# Patient Record
Sex: MALE | Race: WHITE | NOT HISPANIC OR LATINO | Employment: FULL TIME | URBAN - METROPOLITAN AREA
[De-identification: names, ages, dates, MRNs, and addresses within clinical notes are randomized per-mention and may not be internally consistent; named-entity substitution may affect disease eponyms.]

---

## 2022-12-06 ENCOUNTER — APPOINTMENT (OUTPATIENT)
Dept: GENERAL RADIOLOGY | Facility: HOSPITAL | Age: 57
End: 2022-12-06

## 2022-12-06 PROCEDURE — 71046 X-RAY EXAM CHEST 2 VIEWS: CPT

## 2022-12-06 PROCEDURE — 71046 X-RAY EXAM CHEST 2 VIEWS: CPT | Performed by: EMERGENCY MEDICINE

## 2025-01-24 ENCOUNTER — OFFICE VISIT (OUTPATIENT)
Dept: ORTHOPEDIC SURGERY | Facility: CLINIC | Age: 60
End: 2025-01-24
Payer: OTHER MISCELLANEOUS

## 2025-01-24 VITALS — TEMPERATURE: 98.3 F | BODY MASS INDEX: 31.21 KG/M2 | HEIGHT: 70 IN | WEIGHT: 218 LBS

## 2025-01-24 DIAGNOSIS — S46.009A ROTATOR CUFF INJURY, INITIAL ENCOUNTER: Primary | ICD-10-CM

## 2025-01-24 DIAGNOSIS — M25.511 RIGHT SHOULDER PAIN, UNSPECIFIED CHRONICITY: ICD-10-CM

## 2025-01-24 RX ORDER — TESTOSTERONE CYPIONATE 200 MG/ML
INJECTION, SOLUTION INTRAMUSCULAR
COMMUNITY
Start: 2025-01-16

## 2025-01-24 RX ORDER — LEVOTHYROXINE SODIUM 125 UG/1
1 TABLET ORAL DAILY
COMMUNITY
Start: 2024-10-30

## 2025-01-24 RX ORDER — FINASTERIDE 1 MG/1
1 TABLET, FILM COATED ORAL DAILY
COMMUNITY
Start: 2025-01-15

## 2025-01-24 NOTE — PROGRESS NOTES
[facility]              New Problem      Patient Name: Ortega Corral  : 1965  Primary Care Physician: Provider, No Known        Chief Complaint:  Right shoulder injury    HPI:   Ortega Corral is a 59 y.o. year old who presents today for evaluation.  History of Present Illness  The patient presents for evaluation of a shoulder injury.    He sustained the injury on 2024 after slipping on ice and landing on his arm. He experienced immediate pain, which persisted for approximately 3 hours post-incident. The pain is now primarily triggered by movement, particularly during sleep. He sought medical attention at [facility], where radiographic imaging was performed. The initial interpretation suggested no fractures.The pain is absent when the arm is stationary but is felt in the back of the joint during certain movements, such as washing his hair. He also experiences pain at the top and back of the shoulder when reaching in specific directions. Slow movements allow him to reach certain positions without pain. He reports no elbow pain. He is right-handed and works as an , a job he has not missed due to this injury as he has more of a supervisory role. He has been managing the pain with Aleve, taken before work and 2 to 3 hours before bedtime. He was prescribed muscle relaxers at [facility] but has not taken them. He has found some relief from melatonin, which he has taken for the past 2 nights to help with sleep but not pain.    SOCIAL HISTORY  He works as an .    MEDICATIONS  Aleve, melatonin    Past Medical/Surgical, Social and Family History:  I have reviewed and/or updated pertinent history as noted in the medical record including:  Past Medical History:   Diagnosis Date    Hypertension      Past Surgical History:   Procedure Laterality Date    KNEE SURGERY Right     ORIF CALCANEAL FRACTURE Left      Social History     Occupational History    Not on file    Tobacco Use    Smoking status: Former     Types: Cigarettes     Passive exposure: Never    Smokeless tobacco: Never    Tobacco comments:     Currently vapes   Vaping Use    Vaping status: Never Used   Substance and Sexual Activity    Alcohol use: Never    Drug use: Defer    Sexual activity: Yes     Partners: Female          Allergies: No Known Allergies    Medications:   Home Medications:  Current Outpatient Medications on File Prior to Visit   Medication Sig    Cholecalciferol (VITAMIN D3 PO) Take  by mouth.    finasteride (PROPECIA) 1 MG tablet Take 1 tablet by mouth Daily.    hydroCHLOROthiazide (HYDRODIURIL) 25 MG tablet TAKE 1 TABLET BY MOUTH ONCE DAILY IN THE MORNING FOR HIGH BLOOD PRESSURE/SWELLING    levothyroxine (SYNTHROID, LEVOTHROID) 125 MCG tablet Take 1 tablet by mouth Daily.    lisinopril (PRINIVIL,ZESTRIL) 20 MG tablet TAKE 1 TABLET BY MOUTH IN THE MORNING FOR HIGH BLOOD PRESSURE    Magnesium 400 MG capsule Take  by mouth.    metoprolol succinate XL (TOPROL-XL) 25 MG 24 hr tablet Take 1 tablet by mouth Daily.    Multiple Vitamins-Minerals (ZINC PO) Take 50 mg by mouth.    omeprazole (priLOSEC) 20 MG capsule Take 1 capsule by mouth Daily.    Prasterone, DHEA, (DHEA PO) Take  by mouth. DHEA 25    Testosterone Cypionate (DEPOTESTOTERONE CYPIONATE) 200 MG/ML injection INJECT 0.3ml twice WEEKLY into THE muscle    methylPREDNISolone (MEDROL) 4 MG dose pack Take as directed on package instructions.     No current facility-administered medications on file prior to visit.         ROS:  ROS negative except as listed in the HPI.    Physical Exam:   59 y.o. male  Body mass index is 31.28 kg/m²., 98.9 kg (218 lb)  Vitals:    01/24/25 1250   Temp: 98.3 °F (36.8 °C)     General: Alert, cooperative, appears well and in no observable distress. Appears stated age and BMI as listed above.  HEENT: Normocephalic, atraumatic on external visual inspection.  CV: No significant peripheral edema.  Respiratory: Normal  respiratory effort.  Skin: Warm & well perfused; appropriate skin turgor.  Psych: Appropriate mood & affect.  Neuro: Gross sensation and motor intact in affected extremity/extremities.  Vascular: Peripheral pulses palpable in affected extremity/extremities.   Physical Exam  Shoulder exam was performed.    MSK Exam:  Right Shoulder  No obvious deformities or wounds.   No redness or significant swelling.  Tenderness  lateral acromial, posterior acromial  Definitive painful arc.  + drop arm test  AROM  Flexion 180   ER 50   IR SI region     Rotator Cuff Strength:  Supraspinatus: 4+  ER: 3   IR: 4+  Isometric testing of the rotator cuff is painful.    Left Shoulder  No deformity or wounds.  No redness or swelling.  No tenderness to palpation.  Full, painless AROM.  Cuff Strength 4+ to 5/5 with isometric testing of the rotator cuff and painless.  Negative provocative testing.    Brief examination of the cervical spine did not reproduce any specific radicular pattern or symptoms.   Strength is reasonable and symmetric in the upper extremities.       Radiology:    Results  Imaging  X-ray of the shoulder shows no fracture but significant arthritis in the AC joint.    The following X-rays were ordered/reviewed today to evaluate the patient's symptoms: Shoulder: AP, Scapular Y and Axillary Lateral of right shoulder(s) show moderately advanced hypertrophic acromioclavicular joint arthritis. No obvious fractures. He has reasonable GH and subacromial space. Multiple calcified granulomas in the visualized portion of the lung fields. There are no prior films available for direct comparison.    Procedure:   N/A      Misc. Data/Labs: N/A    Assessment & Plan:      ICD-10-CM ICD-9-CM   1. Rotator cuff injury, initial encounter  S46.009A 959.2   2. Right shoulder pain, unspecified chronicity  M25.511 719.41     No orders of the defined types were placed in this encounter.    Orders Placed This Encounter   Procedures    XR Shoulder 2+  View Right    MRI Shoulder Right Without Contrast       Assessment & Plan  1. Shoulder injury, R. Suspect full thickness rotator cuff tear.  The x-ray does not indicate any fractures, but there is significant arthritis in the AC joint. Given the weakness, a rotator cuff tear is a concern. An MRI will be ordered to confirm or rule out a rotator cuff tear. He has been advised to continue taking Aleve as needed for pain management. If the pain becomes severe and he wishes to try an oral steroid, he should inform us. I would avoid an injection today given that it could interfere with surgical options if warranted. If the MRI reveals a rotator cuff tear, a referral to a surgeon will be made. If there is no tear and this is amenable to additional conservative treatment, an injection and physical therapy may be considered.    Continue with activity modifications as needed/discussed.  Continue ICE and/or HEAT PRN.  Recommend to continue activity as tolerated, focus on stretching and strengthening of the joint.    Focus on posture and body mechanics to improve/prevent symptoms.   Patient encouraged to call with questions or concerns prior to follow up.  Will discuss with attending as needed.  Consider additional referrals, work up and/or advanced imaging as indicated or if patient fails to respond to conservative care.    Return for follow up after the MRI.    Patient or patient representative verbalized consent for the use of Ambient Listening during the visit with  MARGE Cook for chart documentation. 1/24/2025  13:17 MARGE Connor    Dictation software was used to complete a portion or all of this note.

## 2025-01-30 DIAGNOSIS — S46.009A ROTATOR CUFF INJURY, INITIAL ENCOUNTER: ICD-10-CM

## 2025-01-30 DIAGNOSIS — M25.511 RIGHT SHOULDER PAIN, UNSPECIFIED CHRONICITY: ICD-10-CM

## 2025-01-31 ENCOUNTER — TELEPHONE (OUTPATIENT)
Dept: ORTHOPEDIC SURGERY | Facility: CLINIC | Age: 60
End: 2025-01-31
Payer: COMMERCIAL

## 2025-02-05 ENCOUNTER — OFFICE VISIT (OUTPATIENT)
Dept: ORTHOPEDIC SURGERY | Facility: CLINIC | Age: 60
End: 2025-02-05
Payer: OTHER MISCELLANEOUS

## 2025-02-05 VITALS — HEIGHT: 70 IN | BODY MASS INDEX: 31.78 KG/M2 | WEIGHT: 222 LBS | TEMPERATURE: 98.2 F

## 2025-02-05 DIAGNOSIS — M75.100 TEAR OF ROTATOR CUFF, UNSPECIFIED LATERALITY, UNSPECIFIED TEAR EXTENT, UNSPECIFIED WHETHER TRAUMATIC: Primary | ICD-10-CM

## 2025-02-05 PROCEDURE — 99214 OFFICE O/P EST MOD 30 MIN: CPT | Performed by: ORTHOPAEDIC SURGERY

## 2025-02-05 NOTE — PROGRESS NOTES
Patient: Ortega Corral    YOB: 1965    Medical Record Number: [id number]    Chief Complaints:  Referral for right shoulder injury    History of Present Illness:     59 y.o. male patient who presents for evaluation of the right shoulder.  The patient is referred to me for further evaluation by Dr. Ovalles and Raffi.  He works as an .  He slipped and fell on ice about a month ago at work.  He immediately felt a sharp, stabbing pain in his shoulder.  Ever since then, he has had chronic pain and weakness.  He says the shoulder was fine prior to this incident.  He does admit that the pain has steadily gotten better.  His biggest complaint at this point is the weakness and dysfunction.  He is right-hand dominant.  He is a very active robert.  Not only does he work as an  but he is an avid bass fisherman and golfer.  Bass fishing season is coming up in the near term.  Of note, he tells me that he his father and his brother both have shoulder replacements.  They did fairly well with those replacement but they are not perfect.  Towards the end of his father's life, he says that he could hardly raise his arm even despite the replacement.    Allergies: No Known Allergies    Home Medications:    Current Outpatient Medications:     Cholecalciferol (VITAMIN D3 PO), Take  by mouth., Disp: , Rfl:     hydroCHLOROthiazide (HYDRODIURIL) 25 MG tablet, TAKE 1 TABLET BY MOUTH ONCE DAILY IN THE MORNING FOR HIGH BLOOD PRESSURE/SWELLING, Disp: , Rfl:     levothyroxine (SYNTHROID, LEVOTHROID) 125 MCG tablet, Take 1 tablet by mouth Daily., Disp: , Rfl:     lisinopril (PRINIVIL,ZESTRIL) 20 MG tablet, TAKE 1 TABLET BY MOUTH IN THE MORNING FOR HIGH BLOOD PRESSURE, Disp: , Rfl:     Magnesium 400 MG capsule, Take  by mouth., Disp: , Rfl:     metoprolol succinate XL (TOPROL-XL) 25 MG 24 hr tablet, Take 1 tablet by mouth Daily., Disp: , Rfl:     Multiple Vitamins-Minerals (ZINC PO), Take 50 mg by mouth., Disp: ,  Rfl:     omeprazole (priLOSEC) 20 MG capsule, Take 1 capsule by mouth Daily., Disp: , Rfl:     Prasterone, DHEA, (DHEA PO), Take  by mouth. DHEA 25, Disp: , Rfl:     Testosterone Cypionate (DEPOTESTOTERONE CYPIONATE) 200 MG/ML injection, INJECT 0.3ml twice WEEKLY into THE muscle, Disp: , Rfl:     finasteride (PROPECIA) 1 MG tablet, Take 1 tablet by mouth Daily., Disp: , Rfl:     methylPREDNISolone (MEDROL) 4 MG dose pack, Take as directed on package instructions., Disp: 21 tablet, Rfl: 0    Past Medical History:   Diagnosis Date    Acromioclavicular separation 01/13/25    Fracture, foot 1997    Frozen shoulder 01/13/25    Hypertension     Rotator cuff syndrome 01/13/25    Tear of meniscus of knee 1995       Past Surgical History:   Procedure Laterality Date    ANKLE OPEN REDUCTION INTERNAL FIXATION  1997    FOOT SURGERY  1997    KNEE SURGERY Right 1995    ORIF CALCANEAL FRACTURE Left 1997       Social History     Occupational History    Not on file   Tobacco Use    Smoking status: Former     Current packs/day: 0.00     Types: Cigarettes     Start date: 1/9/1978     Quit date: 1/9/2010     Years since quitting: 15.0     Passive exposure: Never    Smokeless tobacco: Never    Tobacco comments:     Currently vapes   Vaping Use    Vaping status: Never Used   Substance and Sexual Activity    Alcohol use: Yes     Alcohol/week: 24.0 standard drinks of alcohol     Types: 24 Cans of beer per week    Drug use: Never    Sexual activity: Yes     Partners: Female     Birth control/protection: None      Social History     Social History Narrative    Not on file       No family history on file.    Review of Systems:      Constitutional: Denies fever, shaking or chills   Eyes: Denies change in visual acuity   HEENT: Denies nasal congestion or sore throat   Respiratory: Denies cough or shortness of breath   Cardiovascular: Denies chest pain or edema  Endocrine: Denies tremors, palpitations, intolerance of heat or cold, polyuria,  "polydipsia.  GI: Denies abdominal pain, nausea, vomiting, bloody stools or diarrhea  : Denies frequency, urgency, incontinence, retention, or nocturia.  Musculoskeletal: Denies numbness, tingling or loss of motor function except as above  Integument: Denies rash, lesion or ulceration   Neurologic: Denies headache or focal weakness, deficits  Heme: Denies spontaneous or excessive bleeding, epistaxis, hematuria, melena, fatigue, enlarged or tender lymph nodes.      All other pertinent positives and negatives as noted above in HPI.    Physical Exam:   59 y.o. male    Vitals:    02/05/25 1618   Temp: 98.2 °F (36.8 °C)   TempSrc: Temporal   Weight: 101 kg (222 lb)   Height: 177.8 cm (70\")     General:  Patient is awake and alert.  Appears in no acute distress or discomfort.    Psych:  Affect and demeanor are appropriate.    Cardiovascular:  Regular rate and rhythm.    Lungs:  Good chest expansion.  Breathing unlabored.    Extremities: Right shoulder is examined. Skin is benign.  No palpable masses or adenopathy.  Moderate tenderness noted over anterior glenohumeral joint and rotator interval.  Motion is full.  No instability.  4 out of 5 strength with elevation in the scapular plane and external rotation.  Negative ER lag sign.  Good motor and sensory function in lower arm and hand.  Brisk capillary refill in hand.  Palpable radial pulse.  Good skin turgor.    Imaging:  Previous x-rays including AP, scapular Y and axillary views of the right shoulder are reviewed.  The x-rays show no significant findings.      MRI of the right shoulder is reviewed as well.  I have independently interpreted the images.  He has a massive retracted rotator cuff tear involving the supraspinatus and infraspinatus.  These are retracted back to the level of the glenoid although there does not appear to be any significant atrophy.  He has superior migration of the humeral head and mild to moderate glenohumeral arthritis.    Assessment/Plan:  " Massive right rotator cuff tear     I showed him the MRI and we discussed the findings in detail.  He has a massive retracted tear with superior migration of the humeral head.  I told him I think the likelihood of success with an attempted repair is very low.  We had a long discussion about conservative and surgical treatment options.  We talked about surgical options including an attempted repair versus capsular reconstruction versus a reverse arthroplasty.  All 3 options were thoroughly discussed including all risks, benefits and alternatives.  He acknowledged understanding this information. He stated that he is in no hurry to consider any surgical options.  He would like to try the conservative approach at first.  I told him I think that is very reasonable.  I gave him a referral to PT.  I want to see him back in 6 weeks to check his progress.    Marlon Ann MD    02/05/2025

## 2025-02-11 ENCOUNTER — TREATMENT (OUTPATIENT)
Dept: PHYSICAL THERAPY | Facility: CLINIC | Age: 60
End: 2025-02-11
Payer: OTHER MISCELLANEOUS

## 2025-02-11 DIAGNOSIS — S46.011A TRAUMATIC COMPLETE TEAR OF RIGHT ROTATOR CUFF, INITIAL ENCOUNTER: Primary | ICD-10-CM

## 2025-02-11 DIAGNOSIS — R29.898 RIGHT ARM WEAKNESS: ICD-10-CM

## 2025-02-11 DIAGNOSIS — M25.611 DECREASED SHOULDER MOBILITY, RIGHT: ICD-10-CM

## 2025-02-11 DIAGNOSIS — R68.89 ACTIVITY INTOLERANCE: ICD-10-CM

## 2025-02-11 PROCEDURE — 97112 NEUROMUSCULAR REEDUCATION: CPT | Performed by: PHYSICAL THERAPIST

## 2025-02-11 PROCEDURE — 97161 PT EVAL LOW COMPLEX 20 MIN: CPT | Performed by: PHYSICAL THERAPIST

## 2025-02-11 PROCEDURE — 97110 THERAPEUTIC EXERCISES: CPT | Performed by: PHYSICAL THERAPIST

## 2025-02-11 PROCEDURE — 97530 THERAPEUTIC ACTIVITIES: CPT | Performed by: PHYSICAL THERAPIST

## 2025-02-11 NOTE — PROGRESS NOTES
Physical Therapy Initial Evaluation and Plan of Care  [facility] [place]   [address], [address]  [place] [address]  P: [phone]  F: [phone]    Patient: Ortega Corral   : 1965  Diagnosis/ICD-10 Code:  Traumatic complete tear of right rotator cuff, initial encounter [S46.011A]  Referring practitioner: Marlon Ann MD  Date of Initial Visit: 2025  Today's Date: 2025  Patient seen for 1 session         Visit Diagnoses:    ICD-10-CM ICD-9-CM   1. Traumatic complete tear of right rotator cuff, initial encounter  S46.011A 840.4   2. Right arm weakness  R29.898 729.89   3. Decreased shoulder mobility, right  M25.611 719.51   4. Activity intolerance  R68.89 780.99       PMHx Reviewed : 2025      Subjective Evaluation    History of Present Illness  Mechanism of injury: Hx:   Pt reports to clinic for IE of R RCT. Pt reports he fell on ice at work and landed on his R shoulder. Pt thought he broke his shoulder first.   Pt met w/ ortho surgeon and has declined surgical repair at this time in order to pursue. Pt denies any type of radicular pain. Pt notes some gradual improvement w/ pain overall but has days where the pain is very bad.       PMH includes:  - N/A    Pt reported difficulties:  - self reported fxn status =  60/100%  - pain w/ sleeping   - reaching behind back  - has to go slowly w/ motion otherwise very painful       Hobbies (impacted by dysfxn):  - golf  - bass fishing       Patient Occupation: electrican: [employer] Quality of life: excellent    Pain  Current pain ratin  At best pain ratin  At worst pain ratin  Quality: sharp and knife-like  Relieving factors: relaxation, rest, medications and heat  Aggravating factors: overhead activity, prolonged positioning, outstretched reach, repetitive movement and sleeping  Progression: improved    Hand dominance: right    Treatments  No previous or current treatments  Patient Goals  Patient  goals for therapy: increased motion and decreased pain             Objective          Active Range of Motion   Left Shoulder   Normal active range of motion    Right Shoulder   Flexion: 175 (3/10) degrees with pain  Abduction: 145 (3/10) degrees with pain  External rotation 90°: 35 (3/10) degreeswith pain    Additional Active Range of Motion Details  BTB IR  R =L1  L = T4     Strength/Myotome Testing     Left Shoulder     Planes of Motion   Flexion: 5   Abduction: 5   External rotation at 90°: 5   Internal rotation at 90°: 5     Right Shoulder     Planes of Motion   Flexion: 4   Abduction: 4- (4/10)   External rotation at 90°: 3+ (5/10)   Internal rotation at 90°: 5           Assessment & Plan       Assessment  Impairments: abnormal coordination, abnormal or restricted ROM, activity intolerance, impaired physical strength, lacks appropriate home exercise program, pain with function and weight-bearing intolerance   Functional limitations: carrying objects, lifting, sleeping, pulling, pushing, uncomfortable because of pain, moving in bed, reaching behind back, reaching overhead and unable to perform repetitive tasks   Assessment details: Pt presents to clinic for IE of R RCT. Pt demonstrates decreased ROM, UE weakness, pain, decreased activity tolerance.  Pt will benefit from skilled PT services at this time to address found dysfxn/deficits in order to achieve POC goals for pt return to PLOF and improve QoL.   Prognosis: good    Goals  Plan Goals: STG: (achieve in 4+ weeks)  1. Pt will demo HEP compliance and attendance w/ scheduled therapy visits.   2. Pt will demo 4/5 MMT grades of RUE for fxn strength w/ IADLs/work duties.   3. Pt will demo improved R UE ROM by 5-10 degrees for fxn mobility w/ IADLs and work duties.   4. Pt will report decreased pain from 8/10 to 6/10 at worst or less on pain scale for QoL and progression of PoC.     LTG: (achieve in 8+ weeks)  1. Pt will demo 4+/5 MMT grades of RUE for fxn  strength w/ IADLs/work duties.  2. Pt will improve R UE BTB IR by 3 VS or more in order to improve fxn ROM to facilitate return to or exceed PLOF.   3. Pt will reduce Quick DASH score by 20% or more indicating improved fxn capability of the R UE for performance of IADLs.    4. Pt will report a 10% increase or more in his self reported fxn status indicating improvement/resolution of c/c and efficacy of skilled PT interventions to improve pt fxn performance of IADLS/work duties.     Plan  Therapy options: will be seen for skilled therapy services  Planned modality interventions: cryotherapy, dry needling, iontophoresis and TENS  Planned therapy interventions: ADL retraining, body mechanics training, fine motor coordination training, flexibility, functional ROM exercises, home exercise program, IADL retraining, joint mobilization, manual therapy, neuromuscular re-education, soft tissue mobilization, spinal/joint mobilization, strengthening, stretching and therapeutic activities  Frequency: 1x week  Duration in weeks: 11  Treatment plan discussed with: patient  Plan details: Access Code: [email or web address]              Manual Therapy:     0     mins  60768;  Therapeutic Exercise:     10     mins  00614;     Neuromuscular Rishi:       10    mins  79915;    Therapeutic Activity:      10     mins  82907;     Gait Trainin     mins  35370;     Ultrasound:      0     mins  27577;    Electrical Stimulation:     0     mins  61306 ( );  Dry Needling      0     mins self-pay  Traction      0     mins 21082  Canalith Repositioning    0     mins 40762      Timed Treatment:   30   mins   Total Treatment:     50   mins      PT: Ricky Bose PT     License Number: [id number]  Electronically signed by Ricky Bose PT, 25, 3:38 PM EST    Certification Period: 2025 thru 2025  I certify that the therapy services are furnished while this patient is under my care.  The services outlined above are required by this  patient, and will be reviewed every 90 days.         Physician Signature:__________________________________________________    PHYSICIAN: Marlon Ann MD  NPI: [id number]                                      DATE:      Please sign in Epic or return via fax to [email or web address] . Thank you, [facility].

## 2025-02-18 ENCOUNTER — TREATMENT (OUTPATIENT)
Dept: PHYSICAL THERAPY | Facility: CLINIC | Age: 60
End: 2025-02-18
Payer: OTHER MISCELLANEOUS

## 2025-02-18 DIAGNOSIS — R68.89 ACTIVITY INTOLERANCE: ICD-10-CM

## 2025-02-18 DIAGNOSIS — M25.611 DECREASED SHOULDER MOBILITY, RIGHT: ICD-10-CM

## 2025-02-18 DIAGNOSIS — S46.011A TRAUMATIC COMPLETE TEAR OF RIGHT ROTATOR CUFF, INITIAL ENCOUNTER: Primary | ICD-10-CM

## 2025-02-18 DIAGNOSIS — R29.898 RIGHT ARM WEAKNESS: ICD-10-CM

## 2025-02-18 PROCEDURE — 97112 NEUROMUSCULAR REEDUCATION: CPT | Performed by: PHYSICAL THERAPIST

## 2025-02-18 PROCEDURE — 97110 THERAPEUTIC EXERCISES: CPT | Performed by: PHYSICAL THERAPIST

## 2025-02-18 PROCEDURE — 97530 THERAPEUTIC ACTIVITIES: CPT | Performed by: PHYSICAL THERAPIST

## 2025-02-18 NOTE — PROGRESS NOTES
Physical Therapy Daily Treatment Note  [facility] [place]   [address], [address]  [place] [address]  P: [phone]  F: [phone]    Patient: Ortega Corral   : 1965  Referring practitioner: Marlon Ann MD  Date of Initial Visit: Type: THERAPY  Noted: 2025  Today's Date: 2025  Patient seen for 2 sessions       Visit Diagnoses:    ICD-10-CM ICD-9-CM   1. Traumatic complete tear of right rotator cuff, initial encounter  S46.011A 840.4   2. Right arm weakness  R29.898 729.89   3. Decreased shoulder mobility, right  M25.611 719.51   4. Activity intolerance  R68.89 780.99         Subjective     Ortega Corral reports: Rates pain a 2/10. Felt better after exercises last visit, rather than feeling more sore. Reaching above and out to the side motions are challenging. Is now able to wash his hair in the shower, where he couldn't when he first tore his RC.         Objective   See Exercise, Manual, and Modality Logs for complete treatment.   R IR= L1      Assessment:  Added TRX strap for dynamic shoulder mobility warm up. Reviewed HEP with pt, providing verbal and tactile cues for proper form/technique. Pt continues to display limitations in R shoulder IR ROM as well as being limited by pain with R shoulder ER. Pt will continue to benefit from skilled therapy to return to normal household and work related tasks.         Plan:  Progress per Plan of Care            Timed:         Manual Therapy:         mins  38072;     Therapeutic Exercise:    10     mins  54922;     Neuromuscular Rishi:    10    mins  93723;    Therapeutic Activity:     12     mins  85783;     Gait Training:           mins  31427;     Ultrasound:          mins  29301;    Ionto                                   mins  64033  Self Care                            mins  27725    Un-Timed:  Electrical Stimulation:         mins  85738 (MC );  Traction          mins 38952        Timed Treatment:    32   mins   Total Treatment:     32   mins      Stephen Alvarado, Physical Therapist Assistant  [place] License #: [id number]

## 2025-02-24 ENCOUNTER — TREATMENT (OUTPATIENT)
Dept: PHYSICAL THERAPY | Facility: CLINIC | Age: 60
End: 2025-02-24
Payer: OTHER MISCELLANEOUS

## 2025-02-24 DIAGNOSIS — M25.611 DECREASED SHOULDER MOBILITY, RIGHT: ICD-10-CM

## 2025-02-24 DIAGNOSIS — R29.898 RIGHT ARM WEAKNESS: ICD-10-CM

## 2025-02-24 DIAGNOSIS — S46.011A TRAUMATIC COMPLETE TEAR OF RIGHT ROTATOR CUFF, INITIAL ENCOUNTER: Primary | ICD-10-CM

## 2025-02-24 DIAGNOSIS — R68.89 ACTIVITY INTOLERANCE: ICD-10-CM

## 2025-02-24 NOTE — PROGRESS NOTES
"Physical Therapy Daily Treatment Note  [facility] Therapy [place]   [address], [address]  [place] [address]  P: [phone]  F: [phone]    Patient: Ortega Corral   : 1965  Referring practitioner: Marlon Ann MD  Date of Initial Visit: Type: THERAPY  Noted: 2025  Today's Date: 2025  Patient seen for 3 sessions       Visit Diagnoses:    ICD-10-CM ICD-9-CM   1. Traumatic complete tear of right rotator cuff, initial encounter  S46.011A 840.4   2. Right arm weakness  R29.898 729.89   3. Decreased shoulder mobility, right  M25.611 719.51   4. Activity intolerance  R68.89 780.99         Ortega Corral reports: Pt reports he is doing well but notes he is sore today. Pt vacuumed over the weekend and this seems to have \"flared it up\". No new/other complaints/comments noted.       Subjective     Objective   See Exercise, Manual, and Modality Logs for complete treatment.       Assessment/Plan  New interventions were added (wall crawls, resistance series, BTB ball pass) to continue w/ PT PoC for fxn strength and neuromuscular control of R UE  to assist w/ (OHA, fxn strength, ROM).   Pt was able to perform requested interventions today w/out exasperation of R UE pain s/s .   HEP was updated today to progress PT PoC to achieve outlined goals for pt rehab.     Plan:  Pt will continue with current plan of care to achieve outlined goals. Therapeutic interventions will be progressed where and when appropriate.          Timed:         Manual Therapy:    0     mins  39221;     Therapeutic Exercise:    12     mins  90716;     Neuromuscular Rishi:    8    mins  47733;    Therapeutic Activity:     10     mins  84915;     Gait Trainin     mins  34206;     Ultrasound:     0     mins  94289;    Ionto                               0    mins  62843  Self Care                       0     mins  33382  Traction     0     mins 69644      Un-Timed:  Canalith Repos    0     mins " 70906  Electrical Stimulation:    0     mins  91406 ( );  Dry Needling     0     mins self-pay  Traction     0     mins 65710        Timed Treatment:   30   mins   Total Treatment:     30   mins    ELBERT Jennings License #: [id number]    Physical Therapist

## 2025-03-03 ENCOUNTER — OFFICE VISIT (OUTPATIENT)
Dept: ORTHOPEDIC SURGERY | Facility: CLINIC | Age: 60
End: 2025-03-03
Payer: OTHER MISCELLANEOUS

## 2025-03-03 VITALS — TEMPERATURE: 98 F | WEIGHT: 226 LBS | HEIGHT: 71 IN | BODY MASS INDEX: 31.64 KG/M2

## 2025-03-03 DIAGNOSIS — S46.011D TRAUMATIC COMPLETE TEAR OF RIGHT ROTATOR CUFF, SUBSEQUENT ENCOUNTER: Primary | ICD-10-CM

## 2025-03-03 PROCEDURE — 20610 DRAIN/INJ JOINT/BURSA W/O US: CPT | Performed by: NURSE PRACTITIONER

## 2025-03-03 RX ORDER — LIDOCAINE HYDROCHLORIDE 10 MG/ML
2 INJECTION, SOLUTION EPIDURAL; INFILTRATION; INTRACAUDAL; PERINEURAL
Status: COMPLETED | OUTPATIENT
Start: 2025-03-03 | End: 2025-03-03

## 2025-03-03 RX ORDER — METHYLPREDNISOLONE ACETATE 80 MG/ML
80 INJECTION, SUSPENSION INTRA-ARTICULAR; INTRALESIONAL; INTRAMUSCULAR; SOFT TISSUE
Status: COMPLETED | OUTPATIENT
Start: 2025-03-03 | End: 2025-03-03

## 2025-03-03 RX ADMIN — LIDOCAINE HYDROCHLORIDE 2 ML: 10 INJECTION, SOLUTION EPIDURAL; INFILTRATION; INTRACAUDAL; PERINEURAL at 16:10

## 2025-03-03 RX ADMIN — METHYLPREDNISOLONE ACETATE 80 MG: 80 INJECTION, SUSPENSION INTRA-ARTICULAR; INTRALESIONAL; INTRAMUSCULAR; SOFT TISSUE at 16:10

## 2025-03-04 ENCOUNTER — TREATMENT (OUTPATIENT)
Dept: PHYSICAL THERAPY | Facility: CLINIC | Age: 60
End: 2025-03-04
Payer: OTHER MISCELLANEOUS

## 2025-03-04 DIAGNOSIS — M25.611 DECREASED SHOULDER MOBILITY, RIGHT: ICD-10-CM

## 2025-03-04 DIAGNOSIS — R29.898 RIGHT ARM WEAKNESS: ICD-10-CM

## 2025-03-04 DIAGNOSIS — R68.89 ACTIVITY INTOLERANCE: ICD-10-CM

## 2025-03-04 DIAGNOSIS — S46.011A TRAUMATIC COMPLETE TEAR OF RIGHT ROTATOR CUFF, INITIAL ENCOUNTER: Primary | ICD-10-CM

## 2025-03-04 NOTE — PROGRESS NOTES
Mr. Corral comes in today for right shoulder follow up.  He is interested in getting a cortisone injection as previously discussed with Dr. Ann.  Reports he is not interested in proceeding with surgery at this time.      The risks, and alternatives to the injection were discussed.  He verbalized understanding and consented to proceed.  The injection was performed as noted below.    Going forward, he may follow-up as needed.    Large Joint Arthrocentesis: R subacromial bursa  Date/Time: 3/3/2025 4:10 PM  Consent given by: patient  Site marked: site marked  Timeout: Immediately prior to procedure a time out was called to verify the correct patient, procedure, equipment, support staff and site/side marked as required   Supporting Documentation  Indications: pain   Procedure Details  Location: shoulder - R subacromial bursa  Preparation: Patient was prepped and draped in the usual sterile fashion  Needle gauge: 21g.  Approach: posterior  Medications administered: 2 mL lidocaine PF 1% 1 %; 80 mg methylPREDNISolone acetate 80 MG/ML  Patient tolerance: patient tolerated the procedure well with no immediate complications    MARGE Lomax    03/03/2025

## 2025-03-04 NOTE — PROGRESS NOTES
Physical Therapy Daily Treatment Note  [facility] Physical Therapy [place]   [address], [address]  [place] [address]  P: [phone]  F: [phone]    Patient: Ortega Corral   : 1965  Referring practitioner: Marlon Ann MD  Date of Initial Visit: Type: THERAPY  Noted: 2025  Today's Date: 3/4/2025  Patient seen for 4 sessions       Visit Diagnoses:    ICD-10-CM ICD-9-CM   1. Traumatic complete tear of right rotator cuff, initial encounter  S46.011A 840.4   2. Right arm weakness  R29.898 729.89   3. Decreased shoulder mobility, right  M25.611 719.51   4. Activity intolerance  R68.89 780.99         Ortega Corral reports: Pt reports he is doing alright. Pt had an injection yesterday which seemed to help but he was told it can take up to a week to see if the results will be more long term. As of now his shoulder still feels better. No new/other complaints/comments noted.       Subjective     Objective   See Exercise, Manual, and Modality Logs for complete treatment.       Assessment/Plan  New interventions were added (PNF, wall ball roll ups, prone Y/T/W) to continue w/ PT PoC for fxn strength and neuromuscular control of R UE to assist w/ (OHA, work tasks). Pt was able to perform requested interventions today w/out exasperation of R shoulder pain s/s. HEP was updated today to progress PT PoC to achieve outlined goals for pt rehab.       Plan:  Pt will continue with current plan of care to achieve outlined goals. Therapeutic interventions will be progressed where and when appropriate.        Timed:         Manual Therapy:    0     mins  93734;     Therapeutic Exercise:    10     mins  34760;     Neuromuscular Rishi:    8    mins  83712;    Therapeutic Activity:     8     mins  80927;     Gait Trainin     mins  47124;     Ultrasound:     0     mins  24561;    Ionto                               0    mins  12901  Self Care                       0     mins   88443  Traction     0     mins 20593      Un-Timed:  Canalith Repos    0     mins 19178  Electrical Stimulation:    0     mins  27660 ( );  Dry Needling     0     mins self-pay  Traction     0     mins 37165        Timed Treatment:   26   mins   Total Treatment:     26   mins    Ricky Bose, PT  [place] License #: [id number]    Physical Therapist

## 2025-03-10 ENCOUNTER — TREATMENT (OUTPATIENT)
Dept: PHYSICAL THERAPY | Facility: CLINIC | Age: 60
End: 2025-03-10
Payer: OTHER MISCELLANEOUS

## 2025-03-10 DIAGNOSIS — R29.898 RIGHT ARM WEAKNESS: ICD-10-CM

## 2025-03-10 DIAGNOSIS — M25.611 DECREASED SHOULDER MOBILITY, RIGHT: ICD-10-CM

## 2025-03-10 DIAGNOSIS — R68.89 ACTIVITY INTOLERANCE: ICD-10-CM

## 2025-03-10 DIAGNOSIS — S46.011A TRAUMATIC COMPLETE TEAR OF RIGHT ROTATOR CUFF, INITIAL ENCOUNTER: Primary | ICD-10-CM

## 2025-03-10 PROCEDURE — 97110 THERAPEUTIC EXERCISES: CPT | Performed by: PHYSICAL THERAPIST

## 2025-03-10 PROCEDURE — 97530 THERAPEUTIC ACTIVITIES: CPT | Performed by: PHYSICAL THERAPIST

## 2025-03-10 PROCEDURE — 97112 NEUROMUSCULAR REEDUCATION: CPT | Performed by: PHYSICAL THERAPIST

## 2025-03-10 NOTE — PROGRESS NOTES
Physical Therapy Daily Treatment Note  [facility] Physical Therapy [place]   [address], [address]  [place] [address]  P: [phone]  F: [phone]    Patient: Ortega Corral   : 1965  Referring practitioner: Marlon Ann MD  Date of Initial Visit: Type: THERAPY  Noted: 2025  Today's Date: 3/10/2025  Patient seen for 5 sessions       Visit Diagnoses:    ICD-10-CM ICD-9-CM   1. Traumatic complete tear of right rotator cuff, initial encounter  S46.011A 840.4   2. Right arm weakness  R29.898 729.89   3. Decreased shoulder mobility, right  M25.611 719.51   4. Activity intolerance  R68.89 780.99         Ortega Corral reports: Pt reports he is doing well. Pt notes he is doing a lot better overall but still has trouble w/ various activities. No new/other complaints/comments noted.       Subjective   Pt reported difficulties:  - self reported fxn status =  60/100% = 75%   - pain w/ sleeping = improved (less pain overall)    - reaching behind back = improved   - has to go slowly w/ motion otherwise very painful = improved        Hobbies (impacted by dysfxn):  - golf = not done yet   - bass fishing = not done yet      Objective   See Exercise, Manual, and Modality Logs for complete treatment.     Active Range of Motion   Left Shoulder   Normal active range of motion     Right Shoulder   Flexion: 175 (3/10) degrees with pain = 180 0/10 pain   Abduction: 145 (3/10) degrees with pain = 180 0/10 pain   External rotation 90°: 35 (3/10) degreeswith pain = 40 0/10 pain but unable to move further      Additional Active Range of Motion Details  BTB IR  R =L1 = T4   L = T4      Strength/Myotome Testing      Left Shoulder      Planes of Motion   Flexion: 5   Abduction: 5   External rotation at 90°: 5   Internal rotation at 90°: 5      Right Shoulder      Planes of Motion   Flexion: 4 = 5   Abduction: 4- (4/10) = 4+ (3/10)   External rotation at 90°: 3+ (5/10) = 4 (1/10)  Internal rotation at  90°: 5      Assessment/Plan  Pt was re-assessed for progress w/ PT PoC today. Pt has met 100% of STG at time of PN. Pt has unmet goals and remaining functional deficits that warrant continued skilled PT services w/in the PoC at this time.       Goals  Plan Goals: STG: (achieve in 4+ weeks)  1. Pt will demo HEP compliance and attendance w/ scheduled therapy visits. = MET   2. Pt will demo 4/5 MMT grades of RUE for fxn strength w/ IADLs/work duties. = MET  3. Pt will demo improved R UE ROM by 5-10 degrees for fxn mobility w/ IADLs and work duties. = MET  4. Pt will report decreased pain from 8/10 to 6/10 at worst or less on pain scale for QoL and progression of PoC. = MET /10      LTG: (achieve in 8+ weeks) = not assessed at 4wk PN  1. Pt will demo 4+/5 MMT grades of RUE for fxn strength w/ IADLs/work duties.  2. Pt will improve R UE BTB IR by 3 VS or more in order to improve fxn ROM to facilitate return to or exceed PLOF.   3. Pt will reduce Quick DASH score by 20% or more indicating improved fxn capability of the R UE for performance of IADLs.    4. Pt will report a 10% increase or more in his self reported fxn status indicating improvement/resolution of c/c and efficacy of skilled PT interventions to improve pt fxn performance of IADLS/work duties.       Timed:         Manual Therapy:    0     mins  83155;     Therapeutic Exercise:    12     mins  10386;     Neuromuscular Rishi:    8    mins  01178;    Therapeutic Activity:     10     mins  25788;     Gait Trainin     mins  14596;     Ultrasound:     0     mins  33639;    Ionto                               0    mins  67762  Self Care                       0     mins  72028  Traction     0     mins 02155      Un-Timed:  Canalith Repos    0     mins 79516  Electrical Stimulation:    0     mins  98587 ( );  Dry Needling     0     mins self-pay  Traction     0     mins 05239        Timed Treatment:   30   mins   Total Treatment:     30   mins    Ricky  Juice, PT  [place] License #: [id number]    Physical Therapist

## 2025-03-24 ENCOUNTER — TREATMENT (OUTPATIENT)
Dept: PHYSICAL THERAPY | Facility: CLINIC | Age: 60
End: 2025-03-24
Payer: OTHER MISCELLANEOUS

## 2025-03-24 DIAGNOSIS — R29.898 RIGHT ARM WEAKNESS: ICD-10-CM

## 2025-03-24 DIAGNOSIS — M25.611 DECREASED SHOULDER MOBILITY, RIGHT: ICD-10-CM

## 2025-03-24 DIAGNOSIS — S46.011A TRAUMATIC COMPLETE TEAR OF RIGHT ROTATOR CUFF, INITIAL ENCOUNTER: Primary | ICD-10-CM

## 2025-03-24 DIAGNOSIS — R68.89 ACTIVITY INTOLERANCE: ICD-10-CM

## 2025-03-24 PROCEDURE — 97530 THERAPEUTIC ACTIVITIES: CPT | Performed by: PHYSICAL THERAPIST

## 2025-03-24 PROCEDURE — 97112 NEUROMUSCULAR REEDUCATION: CPT | Performed by: PHYSICAL THERAPIST

## 2025-03-24 PROCEDURE — 97110 THERAPEUTIC EXERCISES: CPT | Performed by: PHYSICAL THERAPIST

## 2025-03-24 NOTE — PROGRESS NOTES
Physical Therapy Daily Treatment Note  [facility] Physical Therapy [place]   [address], [address]  [place] [address]  P: [phone]  F: [phone]    Patient: Ortega Corral   : 1965  Referring practitioner: Marlon Ann MD  Date of Initial Visit: Type: THERAPY  Noted: 2025  Today's Date: 3/24/2025  Patient seen for 6 sessions       Visit Diagnoses:    ICD-10-CM ICD-9-CM   1. Traumatic complete tear of right rotator cuff, initial encounter  S46.011A 840.4   2. Right arm weakness  R29.898 729.89   3. Decreased shoulder mobility, right  M25.611 719.51   4. Activity intolerance  R68.89 780.99         Ortega Corral reports: Pt reports he is doing well. Pt notes his vacation went well w/ his shoulder not causing him any issues. No new/other complaints/comments noted.       Subjective     Objective   See Exercise, Manual, and Modality Logs for complete treatment.       Assessment/Plan  Interventions were progressed today (PNF, Y/T/W, band resistance series, shd flex) w/ increased levels of resistance indicating improved fxn strength gains of the R UE musculature indicating improvement w/ IADLs (OHA, work duties).     Plan:  Pt will continue with current plan of care to achieve outlined goals. Therapeutic interventions will be progressed where and when appropriate.        Timed:         Manual Therapy:    0     mins  22946;     Therapeutic Exercise:    11     mins  52437;     Neuromuscular Rishi:    10    mins  29845;    Therapeutic Activity:     10     mins  05357;     Gait Trainin     mins  15474;     Ultrasound:     0     mins  04650;    Ionto                               0    mins  80325  Self Care                       0     mins  71653  Traction     0     mins 53587      Un-Timed:  Canalith Repos    0     mins 12978  Electrical Stimulation:    0     mins  66851 (MC );  Dry Needling     0     mins self-pay  Traction     0     mins 20001        Timed Treatment:    31   mins   Total Treatment:     31   mins    Ricky Bose, PT  [place] License #: [id number]    Physical Therapist

## 2025-03-31 ENCOUNTER — TREATMENT (OUTPATIENT)
Dept: PHYSICAL THERAPY | Facility: CLINIC | Age: 60
End: 2025-03-31
Payer: OTHER MISCELLANEOUS

## 2025-03-31 DIAGNOSIS — R29.898 RIGHT ARM WEAKNESS: ICD-10-CM

## 2025-03-31 DIAGNOSIS — S46.011A TRAUMATIC COMPLETE TEAR OF RIGHT ROTATOR CUFF, INITIAL ENCOUNTER: Primary | ICD-10-CM

## 2025-03-31 DIAGNOSIS — M25.611 DECREASED SHOULDER MOBILITY, RIGHT: ICD-10-CM

## 2025-03-31 DIAGNOSIS — R68.89 ACTIVITY INTOLERANCE: ICD-10-CM

## 2025-03-31 PROCEDURE — 97530 THERAPEUTIC ACTIVITIES: CPT | Performed by: PHYSICAL THERAPIST

## 2025-03-31 PROCEDURE — 97112 NEUROMUSCULAR REEDUCATION: CPT | Performed by: PHYSICAL THERAPIST

## 2025-03-31 PROCEDURE — 97110 THERAPEUTIC EXERCISES: CPT | Performed by: PHYSICAL THERAPIST

## 2025-03-31 NOTE — PROGRESS NOTES
Physical Therapy Daily Treatment Note  [facility] Physical [facility] [place]   [address], [address]  [place] [address]  P: [phone]  F: [phone]    Patient: Ortega Corral   : 1965  Referring practitioner: Marlon Ann MD  Date of Initial Visit: Type: THERAPY  Noted: 2025  Today's Date: 3/31/2025  Patient seen for 7 sessions       Visit Diagnoses:    ICD-10-CM ICD-9-CM   1. Traumatic complete tear of right rotator cuff, initial encounter  S46.011A 840.4   2. Right arm weakness  R29.898 729.89   3. Decreased shoulder mobility, right  M25.611 719.51   4. Activity intolerance  R68.89 780.99         Ortega Corral reports: Pt reports he is doing very well. Pt notes his shoulder feels much better. Pt notes he had very little pain in his shoulder w/ his fishing tournament and is very pleased w/ this. Pt feels he is in a good place to continue w/ PT on his own. No new/other complaints/comments noted.       Subjective     Objective   See Exercise, Manual, and Modality Logs for complete treatment.       Assessment/Plan  Pt is d/c from skilled PT services at this time w/ updated HEP. Therapist and patient are in agreement w/ this plan.         Timed:         Manual Therapy:    0     mins  75547;     Therapeutic Exercise:    12     mins  69753;     Neuromuscular Rishi:    10    mins  69180;    Therapeutic Activity:     8     mins  16467;     Gait Trainin     mins  21560;     Ultrasound:     0     mins  59779;    Ionto                               0    mins  17090  Self Care                       0     mins  09791  Traction     0     mins 98427      Un-Timed:  Canalith Repos    0     mins 27712  Electrical Stimulation:    0     mins  94387 ( );  Dry Needling     0     mins self-pay  Traction     0     mins 98264        Timed Treatment:   30   mins   Total Treatment:     30   mins    Ricky Bose PT  [place] License #: [id number]    Physical Therapist